# Patient Record
Sex: MALE | URBAN - NONMETROPOLITAN AREA
[De-identification: names, ages, dates, MRNs, and addresses within clinical notes are randomized per-mention and may not be internally consistent; named-entity substitution may affect disease eponyms.]

---

## 2022-12-20 ENCOUNTER — APPOINTMENT (RX ONLY)
Dept: URBAN - NONMETROPOLITAN AREA CLINIC 30 | Facility: CLINIC | Age: 72
Setting detail: DERMATOLOGY
End: 2022-12-20

## 2022-12-20 PROBLEM — C43.4 MALIGNANT MELANOMA OF SCALP AND NECK: Status: ACTIVE | Noted: 2022-12-20

## 2022-12-20 PROCEDURE — ? COUNSELING

## 2022-12-20 PROCEDURE — ? TREATMENT REGIMEN

## 2022-12-20 PROCEDURE — 99203 OFFICE O/P NEW LOW 30 MIN: CPT

## 2022-12-20 PROCEDURE — ? REFERRAL

## 2022-12-20 PROCEDURE — ? PRESCRIPTION

## 2022-12-20 RX ORDER — DOXYCYCLINE HYCLATE 100 MG/1
CAPSULE, GELATIN COATED ORAL
Qty: 20 | Refills: 0 | Status: ERX | COMMUNITY
Start: 2022-12-20

## 2022-12-20 RX ADMIN — DOXYCYCLINE HYCLATE: 100 CAPSULE, GELATIN COATED ORAL at 00:00

## 2022-12-20 NOTE — PROCEDURE: TREATMENT REGIMEN
Plan: This was biopsied in early November and showed a 6mm depth invasive MM, looks [facility], but he hasn’t seen a surgeon.  I explained to him that we can not remove this site.  I called Tristan Ramirez, RN nurse manager from [facility] and he is going to help me get him into a surgeon.  I asked the pt if he wanted me to examine anything else and he said no, he thought he was here for the MM.  I spoke with Ike Ramirez and told him after the patient has surgery we can get him back in for a full body skin exam.  \\n\\nThis site smelled and was continuing to bleed.  We bandaged him and I have him antibiotics just in case to cover for infection.
Detail Level: Simple